# Patient Record
Sex: FEMALE | Race: WHITE | NOT HISPANIC OR LATINO | Employment: UNEMPLOYED | URBAN - METROPOLITAN AREA
[De-identification: names, ages, dates, MRNs, and addresses within clinical notes are randomized per-mention and may not be internally consistent; named-entity substitution may affect disease eponyms.]

---

## 2020-10-14 ENCOUNTER — TELEMEDICINE (OUTPATIENT)
Dept: FAMILY MEDICINE CLINIC | Facility: CLINIC | Age: 25
End: 2020-10-14
Payer: COMMERCIAL

## 2020-10-14 DIAGNOSIS — J01.00 ACUTE NON-RECURRENT MAXILLARY SINUSITIS: Primary | ICD-10-CM

## 2020-10-14 PROCEDURE — 99201 PR OFFICE OUTPATIENT NEW 10 MINUTES: CPT | Performed by: FAMILY MEDICINE

## 2020-10-14 RX ORDER — AMOXICILLIN AND CLAVULANATE POTASSIUM 875; 125 MG/1; MG/1
1 TABLET, FILM COATED ORAL EVERY 12 HOURS SCHEDULED
Qty: 14 TABLET | Refills: 0 | Status: SHIPPED | OUTPATIENT
Start: 2020-10-14 | End: 2020-10-21

## 2021-01-26 ENCOUNTER — OFFICE VISIT (OUTPATIENT)
Dept: FAMILY MEDICINE CLINIC | Facility: CLINIC | Age: 26
End: 2021-01-26
Payer: COMMERCIAL

## 2021-01-26 VITALS
BODY MASS INDEX: 21.62 KG/M2 | OXYGEN SATURATION: 98 % | HEIGHT: 63 IN | RESPIRATION RATE: 16 BRPM | HEART RATE: 99 BPM | DIASTOLIC BLOOD PRESSURE: 70 MMHG | WEIGHT: 122 LBS | TEMPERATURE: 98.8 F | SYSTOLIC BLOOD PRESSURE: 110 MMHG

## 2021-01-26 DIAGNOSIS — Z23 NEEDS FLU SHOT: ICD-10-CM

## 2021-01-26 DIAGNOSIS — Z00.00 PHYSICAL EXAM: Primary | ICD-10-CM

## 2021-01-26 PROCEDURE — 86580 TB INTRADERMAL TEST: CPT | Performed by: FAMILY MEDICINE

## 2021-01-26 PROCEDURE — 90471 IMMUNIZATION ADMIN: CPT | Performed by: FAMILY MEDICINE

## 2021-01-26 PROCEDURE — 1036F TOBACCO NON-USER: CPT | Performed by: FAMILY MEDICINE

## 2021-01-26 PROCEDURE — 90682 RIV4 VACC RECOMBINANT DNA IM: CPT | Performed by: FAMILY MEDICINE

## 2021-01-26 PROCEDURE — 3725F SCREEN DEPRESSION PERFORMED: CPT | Performed by: FAMILY MEDICINE

## 2021-01-26 PROCEDURE — 99395 PREV VISIT EST AGE 18-39: CPT | Performed by: FAMILY MEDICINE

## 2021-01-26 PROCEDURE — 3008F BODY MASS INDEX DOCD: CPT | Performed by: FAMILY MEDICINE

## 2021-01-26 NOTE — PROGRESS NOTES
Patient Name:  Allen Ortiz   :  1995   MRN:  [id number]   CSN:  [id number]     Subjective:     REASON FOR VISIT:    Chief Complaint   Patient presents with    Physical Exam        HISTORY OF PRESENT ILLNESS:     Phani Arcos is a 23 y o  female who presents today for physical   She is starting job at  and has paperwork to fill out  She needs PPD shot today  Flu shot:  Patient received counseling today and was educated on importance of flu vaccine  Patient aware of risks of receiving vaccine such as allergy, GBS, and mild illness  Preventive: The patient's BMI is Body mass index is 21 62 kg/m²  Andree Thomas The BMI is in the acceptable range   Diet: mostly home cooked meals- pasta, meat, vegetables, fruit    Exercise: walking   Last Dental Visit: several years ago    Pregnancy History:    P: 0   Menstrual History   Last Pap: denies     LMP: currently, irregular due to hormonal implant   Sexually active: yes with men only (in long term relationship with boyfriend)   Uses STD/pregnancy protection: yes  Method of contraception: Nexplanon implant    History of STD: denies     PAST MEDICAL HISTORY:   History reviewed  No pertinent past medical history  PAST SURGICAL HISTORY:   History reviewed  No pertinent surgical history  CURRENT MEDICATIONS:   Previous Medications    No medications on file        SOCIAL HISTORY:   Social History     Tobacco Use    Smoking status: Never Smoker    Smokeless tobacco: Never Used   Substance Use Topics    Alcohol use:  Yes     Alcohol/week: 1 0 standard drinks     Types: 1 Glasses of wine per week        DEPRESSION SCREENING:   PHQ-9 Depression Screening    PHQ-9:   Frequency of the following problems over the past two weeks:      Little interest or pleasure in doing things: 0 - not at all  Feeling down, depressed, or hopeless: 0 - not at all  PHQ-2 Score: 0           FAMILY HISTORY:   Family History   Problem Relation Age of Onset    No Known Problems Mother  No Known Problems Father     No Known Problems Sister     No Known Problems Brother         ALLERGIES:   No Known Allergies     ROS:   Review of Systems   Constitutional: Negative for activity change, appetite change, chills, fatigue and fever  HENT: Negative for congestion, ear discharge, ear pain, postnasal drip, rhinorrhea, sinus pressure, sinus pain, sneezing, sore throat and trouble swallowing  Eyes: Negative for pain, discharge, redness, itching and visual disturbance  Respiratory: Negative for apnea, cough, chest tightness, shortness of breath and wheezing  Cardiovascular: Negative for chest pain, palpitations and leg swelling  Gastrointestinal: Negative for abdominal distention, abdominal pain, blood in stool, constipation, diarrhea, nausea and vomiting  Endocrine: Negative for polyuria  Genitourinary: Negative for decreased urine volume, difficulty urinating, dyspareunia, dysuria, flank pain, frequency, menstrual problem, pelvic pain, urgency, vaginal bleeding and vaginal discharge  Musculoskeletal: Negative for arthralgias, gait problem, joint swelling and myalgias  Skin: Negative for rash  Neurological: Negative for dizziness, weakness, light-headedness, numbness and headaches  Psychiatric/Behavioral: Negative for behavioral problems and dysphoric mood  The patient is not nervous/anxious  All other systems reviewed and are negative  Objective:     PHYSICAL EXAM:   /70 (BP Location: Left arm, Patient Position: Sitting, Cuff Size: Adult)   Pulse 99   Temp 98 8 °F (37 1 °C) (Tympanic)   Resp 16   Ht 5' 2 99" (1 6 m)   Wt 55 3 kg (122 lb)   LMP 01/26/2021   SpO2 98%   BMI 21 62 kg/m²     Visual Acuity Screening    Right eye Left eye Both eyes   Without correction:      With correction: 20/25 20/25 20/25      Physical Exam  Vitals signs and nursing note reviewed  Constitutional:       General: She is not in acute distress       Appearance: Normal appearance  HENT:      Head: Normocephalic and atraumatic  Right Ear: Tympanic membrane, ear canal and external ear normal       Left Ear: Tympanic membrane, ear canal and external ear normal       Nose: Nose normal  No congestion  Mouth/Throat:      Mouth: Mucous membranes are moist       Pharynx: Oropharynx is clear  Eyes:      Extraocular Movements: Extraocular movements intact  Conjunctiva/sclera: Conjunctivae normal       Pupils: Pupils are equal, round, and reactive to light  Neck:      Musculoskeletal: Normal range of motion  Vascular: No carotid bruit  Cardiovascular:      Rate and Rhythm: Normal rate and regular rhythm  Heart sounds: No murmur  Pulmonary:      Effort: Pulmonary effort is normal  No respiratory distress  Breath sounds: Normal breath sounds  Abdominal:      General: Bowel sounds are normal  There is no distension  Palpations: Abdomen is soft  There is no mass  Musculoskeletal: Normal range of motion  General: No swelling  Lymphadenopathy:      Cervical: No cervical adenopathy  Skin:     General: Skin is warm and dry  Neurological:      General: No focal deficit present  Mental Status: She is alert and oriented to person, place, and time  Psychiatric:         Mood and Affect: Mood normal          Behavior: Behavior normal          Thought Content: Thought content normal           Assessment/Plan:   Problem List Items Addressed This Visit     None      Visit Diagnoses     Physical exam    -  Primary    Relevant Orders    TB Skin Test (Completed)    Ambulatory referral to Obstetrics / Gynecology    Needs flu shot        Relevant Orders    influenza vaccine, quadrivalent, recombinant, PF, 0 5 mL, for patients 18 yr+ (FLU[facility]) (Completed)             Patient Counseling:   · Exercise: Stressed the importance of regular exercise       150 minutes of cardiovascular exercise encouraged weekly  · Nutrition: Stressed importance of moderation in sodium/caffeine intake, saturated fat and cholesterol, caloric balance, sufficient intake of fresh fruits, vegetables, fiber     Clau Palencia DO

## 2024-02-21 PROBLEM — J01.00 ACUTE NON-RECURRENT MAXILLARY SINUSITIS: Status: RESOLVED | Noted: 2020-10-14 | Resolved: 2024-02-21

## 2024-05-20 ENCOUNTER — VBI (OUTPATIENT)
Dept: ADMINISTRATIVE | Facility: OTHER | Age: 29
End: 2024-05-20